# Patient Record
Sex: FEMALE | Race: ASIAN | NOT HISPANIC OR LATINO
[De-identification: names, ages, dates, MRNs, and addresses within clinical notes are randomized per-mention and may not be internally consistent; named-entity substitution may affect disease eponyms.]

---

## 2019-04-03 ENCOUNTER — TRANSCRIPTION ENCOUNTER (OUTPATIENT)
Age: 19
End: 2019-04-03

## 2021-02-13 ENCOUNTER — TRANSCRIPTION ENCOUNTER (OUTPATIENT)
Age: 21
End: 2021-02-13

## 2021-08-02 ENCOUNTER — APPOINTMENT (OUTPATIENT)
Dept: PLASTIC SURGERY | Facility: CLINIC | Age: 21
End: 2021-08-02
Payer: MEDICAID

## 2021-08-02 VITALS
WEIGHT: 135 LBS | OXYGEN SATURATION: 97 % | BODY MASS INDEX: 21.19 KG/M2 | SYSTOLIC BLOOD PRESSURE: 117 MMHG | DIASTOLIC BLOOD PRESSURE: 79 MMHG | HEIGHT: 67 IN | HEART RATE: 75 BPM | TEMPERATURE: 98.3 F

## 2021-08-02 DIAGNOSIS — Z78.9 OTHER SPECIFIED HEALTH STATUS: ICD-10-CM

## 2021-08-02 DIAGNOSIS — Z80.3 FAMILY HISTORY OF MALIGNANT NEOPLASM OF BREAST: ICD-10-CM

## 2021-08-02 PROCEDURE — 99203 OFFICE O/P NEW LOW 30 MIN: CPT

## 2021-08-02 NOTE — HISTORY OF PRESENT ILLNESS
[FreeTextEntry1] : Rosie is a 20 year old female who presents for an initial evaluation.  Patient reports right axillary accessory breast tissue, which has been present for several years. She has been followed by a breast surgeon, Dr. Johnathan Aguilera, who performed a sonogram in 09/2020 stating the mass was accessory breast tissue.  She states the mass gets swollen and tender during menstruation.  She denies any personal history of breast cancer, but states her mother had breast cancer diagnosed at age 41, which she underwent lumpectomy and radiation therapy.  \par \par Rosie any medical history.  Surgical history is notable for bilateral upper blepharoplasty in 6/20 with revision in 2/21. She is currently a  at a podiatry office and is a  at [employer] in occupation therapy.

## 2021-08-02 NOTE — PHYSICAL EXAM
[de-identified] : alert, calm, cooperative\par  [de-identified] : respirations are even and unlabored\par  [de-identified] : right axillary soft tissue mass measuring approximately 5 cm in maximal diameter.  The mass is mobile and nonpulsatile.  There is no open wound or erythema.  There is axillary asymmetry.

## 2021-08-02 NOTE — ASSESSMENT
[FreeTextEntry1] : Documented by Sandee Gonsalez NP acting as a scribe for Yannick Henley MD. All medical record entries made by the Scribe were at my, Dr. Yannick Henley, direction and personally dictated by me. I have reviewed the chart and agree that the record accurately reflects my personal performance of the history, physical exam, assessment and plan.\par

## 2021-08-13 ENCOUNTER — RESULT REVIEW (OUTPATIENT)
Age: 21
End: 2021-08-13

## 2021-08-13 ENCOUNTER — APPOINTMENT (OUTPATIENT)
Dept: ULTRASOUND IMAGING | Facility: CLINIC | Age: 21
End: 2021-08-13
Payer: MEDICAID

## 2021-08-13 PROCEDURE — 76882 US LMTD JT/FCL EVL NVASC XTR: CPT | Mod: RT

## 2021-10-08 ENCOUNTER — OUTPATIENT (OUTPATIENT)
Dept: OUTPATIENT SERVICES | Facility: HOSPITAL | Age: 21
LOS: 1 days | End: 2021-10-08
Payer: MEDICAID

## 2021-10-08 ENCOUNTER — APPOINTMENT (OUTPATIENT)
Dept: ULTRASOUND IMAGING | Facility: IMAGING CENTER | Age: 21
End: 2021-10-08
Payer: MEDICAID

## 2021-10-08 ENCOUNTER — APPOINTMENT (OUTPATIENT)
Dept: DISASTER EMERGENCY | Facility: CLINIC | Age: 21
End: 2021-10-08

## 2021-10-08 DIAGNOSIS — Z00.8 ENCOUNTER FOR OTHER GENERAL EXAMINATION: ICD-10-CM

## 2021-10-08 DIAGNOSIS — Z01.818 ENCOUNTER FOR OTHER PREPROCEDURAL EXAMINATION: ICD-10-CM

## 2021-10-08 DIAGNOSIS — N63.20 UNSPECIFIED LUMP IN THE LEFT BREAST, UNSPECIFIED QUADRANT: ICD-10-CM

## 2021-10-08 PROCEDURE — 76642 ULTRASOUND BREAST LIMITED: CPT

## 2021-10-08 PROCEDURE — 76642 ULTRASOUND BREAST LIMITED: CPT | Mod: 26,LT

## 2021-10-09 LAB — SARS-COV-2 N GENE NPH QL NAA+PROBE: NOT DETECTED

## 2021-10-11 RX ORDER — OXYCODONE AND ACETAMINOPHEN 5; 325 MG/1; MG/1
5-325 TABLET ORAL
Qty: 15 | Refills: 0 | Status: ACTIVE | COMMUNITY
Start: 2021-10-11 | End: 1900-01-01

## 2021-10-12 ENCOUNTER — APPOINTMENT (OUTPATIENT)
Dept: PLASTIC SURGERY | Facility: AMBULATORY SURGERY CENTER | Age: 21
End: 2021-10-12
Payer: MEDICAID

## 2021-10-12 ENCOUNTER — RESULT REVIEW (OUTPATIENT)
Age: 21
End: 2021-10-12

## 2021-10-12 PROCEDURE — ZZZZZ: CPT

## 2021-10-12 PROCEDURE — 19301 PARTIAL MASTECTOMY: CPT | Mod: RT

## 2021-10-14 ENCOUNTER — APPOINTMENT (OUTPATIENT)
Dept: PLASTIC SURGERY | Facility: CLINIC | Age: 21
End: 2021-10-14
Payer: MEDICAID

## 2021-10-14 PROCEDURE — 99024 POSTOP FOLLOW-UP VISIT: CPT

## 2021-10-19 ENCOUNTER — APPOINTMENT (OUTPATIENT)
Dept: PLASTIC SURGERY | Facility: CLINIC | Age: 21
End: 2021-10-19
Payer: MEDICAID

## 2021-10-19 VITALS — WEIGHT: 135 LBS | HEIGHT: 67 IN | BODY MASS INDEX: 21.19 KG/M2

## 2021-10-19 PROCEDURE — 99024 POSTOP FOLLOW-UP VISIT: CPT

## 2021-11-08 ENCOUNTER — APPOINTMENT (OUTPATIENT)
Dept: PLASTIC SURGERY | Facility: CLINIC | Age: 21
End: 2021-11-08
Payer: MEDICAID

## 2021-11-08 VITALS — WEIGHT: 135 LBS | HEIGHT: 67 IN | BODY MASS INDEX: 21.19 KG/M2 | TEMPERATURE: 97.7 F

## 2021-11-08 PROCEDURE — 99024 POSTOP FOLLOW-UP VISIT: CPT

## 2022-01-10 ENCOUNTER — APPOINTMENT (OUTPATIENT)
Dept: PLASTIC SURGERY | Facility: CLINIC | Age: 22
End: 2022-01-10

## 2022-01-20 ENCOUNTER — APPOINTMENT (OUTPATIENT)
Dept: PLASTIC SURGERY | Facility: CLINIC | Age: 22
End: 2022-01-20
Payer: SELF-PAY

## 2022-01-20 DIAGNOSIS — Q83.1 ACCESSORY BREAST: ICD-10-CM

## 2022-01-20 PROCEDURE — 99499Q: CUSTOM | Mod: NC

## 2022-07-18 ENCOUNTER — APPOINTMENT (OUTPATIENT)
Dept: PLASTIC SURGERY | Facility: CLINIC | Age: 22
End: 2022-07-18

## 2022-08-16 ENCOUNTER — RESULT REVIEW (OUTPATIENT)
Age: 22
End: 2022-08-16